# Patient Record
Sex: MALE | Race: WHITE | NOT HISPANIC OR LATINO | URBAN - METROPOLITAN AREA
[De-identification: names, ages, dates, MRNs, and addresses within clinical notes are randomized per-mention and may not be internally consistent; named-entity substitution may affect disease eponyms.]

---

## 2017-10-11 ENCOUNTER — EMERGENCY (EMERGENCY)
Facility: HOSPITAL | Age: 57
LOS: 1 days | Discharge: HOME | End: 2017-10-11
Admitting: EMERGENCY MEDICINE
Payer: COMMERCIAL

## 2017-10-11 VITALS
TEMPERATURE: 98 F | OXYGEN SATURATION: 99 % | WEIGHT: 164.91 LBS | HEART RATE: 81 BPM | RESPIRATION RATE: 20 BRPM | SYSTOLIC BLOOD PRESSURE: 145 MMHG | DIASTOLIC BLOOD PRESSURE: 91 MMHG

## 2017-10-11 DIAGNOSIS — S43.402A UNSPECIFIED SPRAIN OF LEFT SHOULDER JOINT, INITIAL ENCOUNTER: ICD-10-CM

## 2017-10-11 DIAGNOSIS — M25.512 PAIN IN LEFT SHOULDER: ICD-10-CM

## 2017-10-11 DIAGNOSIS — F17.200 NICOTINE DEPENDENCE, UNSPECIFIED, UNCOMPLICATED: ICD-10-CM

## 2017-10-11 DIAGNOSIS — Z79.899 OTHER LONG TERM (CURRENT) DRUG THERAPY: ICD-10-CM

## 2017-10-11 DIAGNOSIS — E78.5 HYPERLIPIDEMIA, UNSPECIFIED: ICD-10-CM

## 2017-10-11 PROCEDURE — 99284 EMERGENCY DEPT VISIT MOD MDM: CPT | Mod: 25

## 2017-10-11 PROCEDURE — 93010 ELECTROCARDIOGRAM REPORT: CPT | Mod: NC

## 2017-10-11 RX ORDER — DIAZEPAM 5 MG
5 TABLET ORAL ONCE
Qty: 0 | Refills: 0 | Status: DISCONTINUED | OUTPATIENT
Start: 2017-10-11 | End: 2017-10-11

## 2017-10-11 RX ORDER — BACLOFEN 100 %
1 POWDER (GRAM) MISCELLANEOUS
Qty: 90 | Refills: 0 | OUTPATIENT
Start: 2017-10-11 | End: 2017-11-10

## 2017-10-11 RX ADMIN — Medication 5 MILLIGRAM(S): at 02:17

## 2017-10-11 NOTE — ED ADULT TRIAGE NOTE - CHIEF COMPLAINT QUOTE
Pt with complaint of left shoulder pain since Sunday but intensifying for the past 2 days.  Pt denies any injuries to affected area.

## 2017-10-11 NOTE — ED PROVIDER NOTE - MEDICAL DECISION MAKING DETAILS
patient with L shoulder pain after over exertion today, taking ibuprofen without improvement of symptoms. given valium in ED. rx sent for baclofen. advised follow up with PCP when he returns to [place]

## 2017-10-11 NOTE — ED PROVIDER NOTE - OBJECTIVE STATEMENT
PMHx tobacco use, hyperlipidemia presents with L shoulder pain that started 2 weeks ago after playing golf, worsened today. 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 denies focal weakness, chest pain. pain worsened on movement of the shoulder. denies fall or direct trauma

## 2018-05-08 NOTE — ED ADULT TRIAGE NOTE - ARRIVAL FROM
Chief Complaint   Patient presents with     RECHECK     Follow up rash      There were no vitals taken for this visit.  Gabby Szymanski LPN     Home

## 2019-10-13 NOTE — ED PROVIDER NOTE - CONSTITUTIONAL, MLM
4 normal... Well appearing, well nourished, awake, alert, oriented to person, place, time/situation and in no apparent distress.

## 2021-03-16 NOTE — ED ADULT NURSE NOTE - PERIPHERAL VASCULAR WDL
Detail Level: Zone Otc Regimen: Recommended alpharet nightly Pulses equal bilaterally, no edema present.